# Patient Record
Sex: MALE | Race: WHITE | NOT HISPANIC OR LATINO | Employment: OTHER | URBAN - NONMETROPOLITAN AREA
[De-identification: names, ages, dates, MRNs, and addresses within clinical notes are randomized per-mention and may not be internally consistent; named-entity substitution may affect disease eponyms.]

---

## 2020-05-24 ENCOUNTER — APPOINTMENT (OUTPATIENT)
Dept: CT IMAGING | Facility: HOSPITAL | Age: 65
End: 2020-05-24

## 2020-05-24 ENCOUNTER — APPOINTMENT (OUTPATIENT)
Dept: GENERAL RADIOLOGY | Facility: HOSPITAL | Age: 65
End: 2020-05-24

## 2020-05-24 ENCOUNTER — HOSPITAL ENCOUNTER (EMERGENCY)
Facility: HOSPITAL | Age: 65
Discharge: HOME | End: 2020-05-24
Attending: EMERGENCY MEDICINE | Admitting: FAMILY MEDICINE

## 2020-05-24 VITALS
WEIGHT: 195 LBS | TEMPERATURE: 98.2 F | RESPIRATION RATE: 20 BRPM | OXYGEN SATURATION: 97 % | HEIGHT: 69 IN | HEART RATE: 88 BPM | DIASTOLIC BLOOD PRESSURE: 77 MMHG | BODY MASS INDEX: 28.88 KG/M2 | SYSTOLIC BLOOD PRESSURE: 132 MMHG

## 2020-05-24 DIAGNOSIS — S00.03XA HEMATOMA OF SCALP, INITIAL ENCOUNTER: ICD-10-CM

## 2020-05-24 DIAGNOSIS — J32.9 SINUSITIS, UNSPECIFIED CHRONICITY, UNSPECIFIED LOCATION: ICD-10-CM

## 2020-05-24 DIAGNOSIS — S22.42XA CLOSED FRACTURE OF MULTIPLE RIBS OF LEFT SIDE, INITIAL ENCOUNTER: Primary | ICD-10-CM

## 2020-05-24 PROCEDURE — 70486 CT MAXILLOFACIAL W/O DYE: CPT | Performed by: RADIOLOGY

## 2020-05-24 PROCEDURE — 71101 X-RAY EXAM UNILAT RIBS/CHEST: CPT

## 2020-05-24 PROCEDURE — 70486 CT MAXILLOFACIAL W/O DYE: CPT

## 2020-05-24 PROCEDURE — 71101 X-RAY EXAM UNILAT RIBS/CHEST: CPT | Performed by: RADIOLOGY

## 2020-05-24 PROCEDURE — 70450 CT HEAD/BRAIN W/O DYE: CPT

## 2020-05-24 PROCEDURE — 99283 EMERGENCY DEPT VISIT LOW MDM: CPT

## 2020-05-24 PROCEDURE — 70450 CT HEAD/BRAIN W/O DYE: CPT | Performed by: RADIOLOGY

## 2020-05-24 RX ORDER — HYDROCODONE BITARTRATE AND ACETAMINOPHEN 7.5; 325 MG/1; MG/1
1 TABLET ORAL EVERY 6 HOURS PRN
Qty: 10 TABLET | Refills: 0 | Status: SHIPPED | OUTPATIENT
Start: 2020-05-24
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
## 2023-03-13 ENCOUNTER — APPOINTMENT (OUTPATIENT)
Dept: GENERAL RADIOLOGY | Facility: HOSPITAL | Age: 68
End: 2023-03-13
Payer: MEDICARE

## 2023-03-13 LAB
ALBUMIN SERPL-MCNC: 4 G/DL (ref 3.5–5.2)
ALBUMIN/GLOB SERPL: 1 G/DL
ALP SERPL-CCNC: 65 U/L (ref 39–117)
ALT SERPL W P-5'-P-CCNC: 55 U/L (ref 1–41)
ANION GAP SERPL CALCULATED.3IONS-SCNC: 18 MMOL/L (ref 5–15)
APTT PPP: 22.1 SECONDS (ref 26.5–34.5)
AST SERPL-CCNC: 82 U/L (ref 1–40)
BASOPHILS # BLD AUTO: 0.19 10*3/MM3 (ref 0–0.2)
BASOPHILS NFR BLD AUTO: 1.6 % (ref 0–1.5)
BILIRUB SERPL-MCNC: 0.4 MG/DL (ref 0–1.2)
BUN SERPL-MCNC: 6 MG/DL (ref 8–23)
BUN/CREAT SERPL: 9 (ref 7–25)
CALCIUM SPEC-SCNC: 9.4 MG/DL (ref 8.6–10.5)
CHLORIDE SERPL-SCNC: 93 MMOL/L (ref 98–107)
CO2 SERPL-SCNC: 20 MMOL/L (ref 22–29)
CREAT SERPL-MCNC: 0.67 MG/DL (ref 0.76–1.27)
DEPRECATED RDW RBC AUTO: 55.4 FL (ref 37–54)
EGFRCR SERPLBLD CKD-EPI 2021: 101.1 ML/MIN/1.73
EOSINOPHIL # BLD AUTO: 0.4 10*3/MM3 (ref 0–0.4)
EOSINOPHIL NFR BLD AUTO: 3.3 % (ref 0.3–6.2)
ERYTHROCYTE [DISTWIDTH] IN BLOOD BY AUTOMATED COUNT: 14.7 % (ref 12.3–15.4)
GLOBULIN UR ELPH-MCNC: 4.2 GM/DL
GLUCOSE SERPL-MCNC: 94 MG/DL (ref 65–99)
HCT VFR BLD AUTO: 50.1 % (ref 37.5–51)
HGB BLD-MCNC: 16.8 G/DL (ref 13–17.7)
HOLD SPECIMEN: NORMAL
HOLD SPECIMEN: NORMAL
IMM GRANULOCYTES # BLD AUTO: 0.06 10*3/MM3 (ref 0–0.05)
IMM GRANULOCYTES NFR BLD AUTO: 0.5 % (ref 0–0.5)
INR PPP: 0.92 (ref 0.9–1.1)
LYMPHOCYTES # BLD AUTO: 2.93 10*3/MM3 (ref 0.7–3.1)
LYMPHOCYTES NFR BLD AUTO: 24.2 % (ref 19.6–45.3)
MCH RBC QN AUTO: 33.7 PG (ref 26.6–33)
MCHC RBC AUTO-ENTMCNC: 33.5 G/DL (ref 31.5–35.7)
MCV RBC AUTO: 100.4 FL (ref 79–97)
MONOCYTES # BLD AUTO: 0.6 10*3/MM3 (ref 0.1–0.9)
MONOCYTES NFR BLD AUTO: 5 % (ref 5–12)
NEUTROPHILS NFR BLD AUTO: 65.4 % (ref 42.7–76)
NEUTROPHILS NFR BLD AUTO: 7.94 10*3/MM3 (ref 1.7–7)
NRBC BLD AUTO-RTO: 0.2 /100 WBC (ref 0–0.2)
PLATELET # BLD AUTO: 559 10*3/MM3 (ref 140–450)
PMV BLD AUTO: 8.4 FL (ref 6–12)
POTASSIUM SERPL-SCNC: 4.6 MMOL/L (ref 3.5–5.2)
PROT SERPL-MCNC: 8.2 G/DL (ref 6–8.5)
PROTHROMBIN TIME: 12.5 SECONDS (ref 12.1–14.7)
RBC # BLD AUTO: 4.99 10*6/MM3 (ref 4.14–5.8)
SODIUM SERPL-SCNC: 131 MMOL/L (ref 136–145)
TROPONIN T SERPL HS-MCNC: 7 NG/L
WBC NRBC COR # BLD: 12.12 10*3/MM3 (ref 3.4–10.8)
WHOLE BLOOD HOLD COAG: NORMAL
WHOLE BLOOD HOLD SPECIMEN: NORMAL

## 2023-03-13 PROCEDURE — 84484 ASSAY OF TROPONIN QUANT: CPT | Performed by: STUDENT IN AN ORGANIZED HEALTH CARE EDUCATION/TRAINING PROGRAM

## 2023-03-13 PROCEDURE — 85610 PROTHROMBIN TIME: CPT | Performed by: STUDENT IN AN ORGANIZED HEALTH CARE EDUCATION/TRAINING PROGRAM

## 2023-03-13 PROCEDURE — 85025 COMPLETE CBC W/AUTO DIFF WBC: CPT | Performed by: STUDENT IN AN ORGANIZED HEALTH CARE EDUCATION/TRAINING PROGRAM

## 2023-03-13 PROCEDURE — 99284 EMERGENCY DEPT VISIT MOD MDM: CPT

## 2023-03-13 PROCEDURE — 71046 X-RAY EXAM CHEST 2 VIEWS: CPT

## 2023-03-13 PROCEDURE — 36415 COLL VENOUS BLD VENIPUNCTURE: CPT

## 2023-03-13 PROCEDURE — 93005 ELECTROCARDIOGRAM TRACING: CPT | Performed by: STUDENT IN AN ORGANIZED HEALTH CARE EDUCATION/TRAINING PROGRAM

## 2023-03-13 PROCEDURE — 85730 THROMBOPLASTIN TIME PARTIAL: CPT | Performed by: STUDENT IN AN ORGANIZED HEALTH CARE EDUCATION/TRAINING PROGRAM

## 2023-03-13 PROCEDURE — 80053 COMPREHEN METABOLIC PANEL: CPT | Performed by: STUDENT IN AN ORGANIZED HEALTH CARE EDUCATION/TRAINING PROGRAM

## 2023-03-13 RX ORDER — SODIUM CHLORIDE 0.9 % (FLUSH) 0.9 %
10 SYRINGE (ML) INJECTION AS NEEDED
Status: DISCONTINUED | OUTPATIENT
Start: 2023-03-13 | End: 2023-03-14 | Stop reason: HOSPADM

## 2023-03-14 ENCOUNTER — HOSPITAL ENCOUNTER (EMERGENCY)
Facility: HOSPITAL | Age: 68
Discharge: HOME | End: 2023-03-14
Attending: EMERGENCY MEDICINE | Admitting: EMERGENCY MEDICINE
Payer: MEDICARE

## 2023-03-14 ENCOUNTER — APPOINTMENT (OUTPATIENT)
Dept: CT IMAGING | Facility: HOSPITAL | Age: 68
End: 2023-03-14
Payer: MEDICARE

## 2023-03-14 VITALS
RESPIRATION RATE: 18 BRPM | SYSTOLIC BLOOD PRESSURE: 134 MMHG | HEART RATE: 105 BPM | TEMPERATURE: 97.8 F | WEIGHT: 195 LBS | OXYGEN SATURATION: 94 % | BODY MASS INDEX: 28.88 KG/M2 | HEIGHT: 69 IN | DIASTOLIC BLOOD PRESSURE: 70 MMHG

## 2023-03-14 DIAGNOSIS — I10 ESSENTIAL HYPERTENSION: ICD-10-CM

## 2023-03-14 DIAGNOSIS — M94.0 COSTOCHONDRITIS: Primary | ICD-10-CM

## 2023-03-14 LAB
D DIMER PPP FEU-MCNC: 6.38 MCGFEU/ML (ref 0–0.69)
GEN 5 2HR TROPONIN T REFLEX: 8 NG/L
NT-PROBNP SERPL-MCNC: 72.5 PG/ML (ref 0–900)
QT INTERVAL: 288 MS
QT INTERVAL: 338 MS
QTC INTERVAL: 432 MS
QTC INTERVAL: 465 MS
TROPONIN T DELTA: 1 NG/L

## 2023-03-14 PROCEDURE — 25510000001 IOPAMIDOL PER 1 ML: Performed by: EMERGENCY MEDICINE

## 2023-03-14 PROCEDURE — 85379 FIBRIN DEGRADATION QUANT: CPT | Performed by: EMERGENCY MEDICINE

## 2023-03-14 PROCEDURE — 96375 TX/PRO/DX INJ NEW DRUG ADDON: CPT

## 2023-03-14 PROCEDURE — 25010000002 KETOROLAC TROMETHAMINE PER 15 MG: Performed by: EMERGENCY MEDICINE

## 2023-03-14 PROCEDURE — 71275 CT ANGIOGRAPHY CHEST: CPT

## 2023-03-14 PROCEDURE — 93005 ELECTROCARDIOGRAM TRACING: CPT | Performed by: EMERGENCY MEDICINE

## 2023-03-14 PROCEDURE — 83880 ASSAY OF NATRIURETIC PEPTIDE: CPT | Performed by: EMERGENCY MEDICINE

## 2023-03-14 PROCEDURE — 25010000002 ONDANSETRON PER 1 MG: Performed by: EMERGENCY MEDICINE

## 2023-03-14 PROCEDURE — 84484 ASSAY OF TROPONIN QUANT: CPT | Performed by: STUDENT IN AN ORGANIZED HEALTH CARE EDUCATION/TRAINING PROGRAM

## 2023-03-14 PROCEDURE — 96374 THER/PROPH/DIAG INJ IV PUSH: CPT

## 2023-03-14 PROCEDURE — 25010000002 HYDROMORPHONE 1 MG/ML SOLUTION: Performed by: EMERGENCY MEDICINE

## 2023-03-14 PROCEDURE — 25010000002 HYDRALAZINE PER 20 MG: Performed by: EMERGENCY MEDICINE

## 2023-03-14 RX ORDER — TRAMADOL HYDROCHLORIDE 50 MG/1
50 TABLET ORAL EVERY 6 HOURS PRN
Qty: 12 TABLET | Refills: 0 | Status: SHIPPED | OUTPATIENT
Start: 2023-03-14 | End: 2023-03-17

## 2023-03-14 RX ORDER — METOPROLOL TARTRATE 5 MG/5ML
5 INJECTION INTRAVENOUS ONCE
Status: COMPLETED | OUTPATIENT
Start: 2023-03-14 | End: 2023-03-14

## 2023-03-14 RX ORDER — NALOXONE HYDROCHLORIDE 4 MG/.1ML
1 SPRAY NASAL AS NEEDED
Qty: 2 EACH | Refills: 0 | Status: SHIPPED | OUTPATIENT
Start: 2023-03-14

## 2023-03-14 RX ORDER — KETOROLAC TROMETHAMINE 30 MG/ML
15 INJECTION, SOLUTION INTRAMUSCULAR; INTRAVENOUS ONCE
Status: COMPLETED | OUTPATIENT
Start: 2023-03-14 | End: 2023-03-14

## 2023-03-14 RX ORDER — ONDANSETRON 2 MG/ML
4 INJECTION INTRAMUSCULAR; INTRAVENOUS ONCE
Status: COMPLETED | OUTPATIENT
Start: 2023-03-14 | End: 2023-03-14

## 2023-03-14 RX ORDER — HYDRALAZINE HYDROCHLORIDE 20 MG/ML
20 INJECTION INTRAMUSCULAR; INTRAVENOUS ONCE
Status: COMPLETED | OUTPATIENT
Start: 2023-03-14 | End: 2023-03-14

## 2023-03-14 RX ORDER — LISINOPRIL 20 MG/1
20 TABLET ORAL DAILY
Qty: 30 TABLET | Refills: 2 | Status: SHIPPED | OUTPATIENT
Start: 2023-03-14 | End: 2023-04-13

## 2023-03-14 RX ADMIN — ONDANSETRON 4 MG: 2 INJECTION INTRAMUSCULAR; INTRAVENOUS at 02:24

## 2023-03-14 RX ADMIN — KETOROLAC TROMETHAMINE 15 MG: 30 INJECTION, SOLUTION INTRAMUSCULAR; INTRAVENOUS at 02:24

## 2023-03-14 RX ADMIN — METOPROLOL TARTRATE 5 MG: 1 INJECTION, SOLUTION INTRAVENOUS at 03:33

## 2023-03-14 RX ADMIN — IOPAMIDOL 80 ML: 755 INJECTION, SOLUTION INTRAVENOUS at 04:05

## 2023-03-14 RX ADMIN — HYDROMORPHONE HYDROCHLORIDE 1 MG: 1 INJECTION, SOLUTION INTRAMUSCULAR; INTRAVENOUS; SUBCUTANEOUS at 03:36

## 2023-03-14 RX ADMIN — HYDRALAZINE HYDROCHLORIDE 20 MG: 20 INJECTION INTRAMUSCULAR; INTRAVENOUS at 02:24

## 2023-03-14 NOTE — ED NOTES
"         MEDICAL SCREENING:    Reason for Visit: pt says he has had midsternal chest pain today, says it \"feels like my lungs, not my heart,\" denies cardiac hx but takes BP medicine. denies cough .    Patient initially seen in triage.  The patient was advised further evaluation and diagnostic testing will be needed, some of the treatment and testing will be initiated in the lobby in order to begin the process.  The patient will be returned to the waiting area for the time being and possibly be re-assessed by a subsequent ED provider.  The patient will be brought back to the treatment area in as timely manner as possible.         Stacey Mora PA  03/13/23 2226    "

## 2023-03-14 NOTE — DISCHARGE INSTRUCTIONS
Prescription for lisinopril 20 mg was called at your local pharmacy.  Please monitor your blood pressure at home, 2X/day and keep a log with those readings.    Eat a low-salt diet.    Follow-up with PCP or cardiology regarding your high blood pressure, in 2 weeks for    Take the pain medications prescribed as directed for chest wall pain.    If any new/acute symptoms or worsening of current presentation please go to the closest urgent care or emergency room for prompt reassessment.

## 2023-03-14 NOTE — ED NOTES
Attempted to draw 2nd Troponin. Patient kept moving arm, talking, and being rude. Will have someone else attempt to collect blood work.

## 2023-03-14 NOTE — ED NOTES
"Patient is refusing blood work, stating to ER staff member, \"I told you it's not my heart, I just started feeling the pain when I was bowling.\"  "

## 2023-03-14 NOTE — ED PROVIDER NOTES
Subjective   History of Present Illness  Anam Booth is a 67-year-old male that reported to the emergency room with midsternal chest wall pain for the past 10 days after going out to play bowling with his family.  Patient describes the pain as midsternal, worse with deep inspiration, worse with palpation of anterior chest wall.  Patient used to be on blood pressure medications a while ago but he discontinued the use of this medication, convinced that he does not need any medications.  Denies any shortness of breath.  No recent travel, no recent exposure to sick contacts.    History provided by:  Patient   used: No        Review of Systems   Constitutional: Negative for appetite change, chills, diaphoresis, fatigue and fever.   HENT: Negative for congestion, nosebleeds, rhinorrhea, sneezing and sore throat.    Eyes: Negative for photophobia and discharge.   Respiratory: Negative for cough, shortness of breath, wheezing and stridor.    Cardiovascular: Positive for chest pain.   Gastrointestinal: Negative for abdominal pain, diarrhea, nausea and vomiting.   Endocrine: Negative for cold intolerance and heat intolerance.   Genitourinary: Negative for decreased urine volume, difficulty urinating and dysuria.   Skin: Negative for rash and wound.   Neurological: Negative for dizziness, facial asymmetry, light-headedness and headaches.   Psychiatric/Behavioral: Negative for agitation, self-injury, sleep disturbance and suicidal ideas.   All other systems reviewed and are negative.      No past medical history on file.    No Known Allergies    No past surgical history on file.    No family history on file.    Social History     Socioeconomic History   • Marital status: Single           Objective   Physical Exam  Vitals and nursing note reviewed.   Constitutional:       General: He is not in acute distress.     Appearance: Normal appearance. He is well-developed and normal weight. He is not ill-appearing,  toxic-appearing or diaphoretic.   HENT:      Head: Normocephalic and atraumatic.      Nose: Nose normal.      Mouth/Throat:      Mouth: Mucous membranes are moist.      Pharynx: Oropharynx is clear.   Eyes:      Conjunctiva/sclera: Conjunctivae normal.      Pupils: Pupils are equal, round, and reactive to light.   Cardiovascular:      Rate and Rhythm: Normal rate and regular rhythm.      Pulses: Normal pulses.      Heart sounds: Normal heart sounds.   Pulmonary:      Effort: Pulmonary effort is normal.      Breath sounds: Normal breath sounds.   Chest:      Chest wall: Tenderness present.   Abdominal:      General: Abdomen is flat. Bowel sounds are normal.   Musculoskeletal:         General: Normal range of motion.      Cervical back: Normal range of motion and neck supple.   Skin:     General: Skin is warm and dry.      Capillary Refill: Capillary refill takes 2 to 3 seconds.   Neurological:      General: No focal deficit present.      Mental Status: He is alert and oriented to person, place, and time. Mental status is at baseline.   Psychiatric:         Mood and Affect: Mood normal.         Behavior: Behavior normal.         Thought Content: Thought content normal.         Judgment: Judgment normal.         Procedures           ED Course  ED Course as of 03/17/23 2258   Mon Mar 13, 2023   2221 EKG at 2118 sinus tachycardia 114 bpm, , QRS 72, QTc 465, regular axis, no significant ST deviation or T wave abnormalities concerning for acute ischemia.  Electronically signed by Kevyn Golden MD, 03/13/23, 10:21 PM EDT.   [KP]   2322  Chest Xray:  IMPRESSION:  No acute cardiopulmonary findings [DS]   2335 CTA Chest:  IMPRESSION:  1. Negative for pulmonary embolus.     2. No acute findings in the chest.. The aorta is normal in appearance. There is no dissection    [DS]   2359 Upon reexamination the patient is medically stable, in no acute distress.  Advised patient results obtained, inconsistent with pulmonary  embolism, pneumonia, acute coronary syndrome, lung tumors, etc.  Patient instructed to take OTC NSAIDs for her costochondritis and follow-up with PCP in the office if not better in a few days.    Exam was completed in the setting of the COVID-19 pandemic.  Counseling: Discussed today's findings, in addition to providing specific details for the plan of care.  Questions are answered and there is agreement with the plan.  Counseling was provided regarding the diagnosis and prognosis. Discussed supportive care, the specific conditions for return, as well as the importance of follow-up. Advised to recheck here immediately with new or worsening symptoms.     [DS]   Fri Mar 17, 2023   2256 D-Dimer, Quant(!): 6.38 [DS]   2256 BUN(!): 6 [DS]   2256 Creatinine(!): 0.67 [DS]   2256 Sodium(!): 131 [DS]   2256 Chloride(!): 93 [DS]   2256 CO2(!): 20.0 [DS]   2256 HS Troponin T: 8 [DS]   2256 PTT(!): 22.1 [DS]   2256 Protime: 12.5 [DS]   2256 INR: 0.92 [DS]   2256 Anion Gap(!): 18.0 [DS]   2256 WBC(!): 12.12 [DS]   2256 MCV(!): 100.4 [DS]   2257 MCH(!): 33.7 [DS]   2257 Neutrophils Absolute(!): 7.94 [DS]   2257 Basophil Rel %(!): 1.6 [DS]   2257 Immature Grans, Absolute(!): 0.06 [DS]      ED Course User Index  [DS] Denton Franklin MD  [KP] Kevyn Golden MD                                           Medical Decision Making  Anam Booth is a 67-year-old male reported emergency room with midsternal chest wall pain for the past 10 days after going out to play bowling with his family.  Patient describes the pain as midsternal, worse with deep inspiration, worse with palpation of anterior chest wall.  Patient used to be on blood pressure medications a while ago but he discontinued the use of this medication, convinced that he does not need any medications.  Denies any shortness of breath.  No recent travel, no recent exposure to sick contacts.    Costochondritis: acute illness or injury  Essential hypertension: acute illness or  injury  Amount and/or Complexity of Data Reviewed  External Data Reviewed: labs, radiology, ECG and notes.  Labs: ordered. Decision-making details documented in ED Course.  Radiology: ordered and independent interpretation performed. Decision-making details documented in ED Course.  ECG/medicine tests: ordered and independent interpretation performed. Decision-making details documented in ED Course.      Risk  Prescription drug management.          Final diagnoses:   Costochondritis   Essential hypertension       ED Disposition  ED Disposition     ED Disposition   Discharge    Condition   Stable    Comment   --             PATIENT CONNECTION - [place]  See Provider List  [facility] [address]  [phone]  Schedule an appointment as soon as possible for a visit today  Patient Connection    [facility] Emergency Department  [address] [facility] [address] [phone]    if unable to see PCP    Wilman Grady MD  [address] [address]  [phone]               Medication List      New Prescriptions    lisinopril 20 MG tablet  Commonly known as: PRINIVIL,ZESTRIL  Take 1 tablet by mouth Daily for 30 days.     naloxone 4 MG/0.1ML nasal spray  Commonly known as: NARCAN  1 spray into the nostril(s) as directed by provider As Needed (oversedation).     traMADol 50 MG tablet  Commonly known as: ULTRAM  Take 1 tablet by mouth Every 6 (Six) Hours As Needed for Moderate Pain for up to 3 days.           Where to Get Your Medications      These medications were sent to [facility] [id number] - [facility] [address] [address] - [phone]  - [phone]   [address] [address]    Phone: [phone]   · lisinopril 20 MG tablet  · naloxone 4 MG/0.1ML nasal spray  · traMADol 50 MG tablet          Denton Franklin MD  03/17/23 3023

## 2024-11-22 ENCOUNTER — OUTSIDE FACILITY SERVICE (OUTPATIENT)
Dept: CARDIOLOGY | Facility: CLINIC | Age: 69
End: 2024-11-22
Payer: MEDICARE